# Patient Record
Sex: FEMALE | Race: WHITE | NOT HISPANIC OR LATINO | Employment: OTHER | URBAN - METROPOLITAN AREA
[De-identification: names, ages, dates, MRNs, and addresses within clinical notes are randomized per-mention and may not be internally consistent; named-entity substitution may affect disease eponyms.]

---

## 2020-03-13 ENCOUNTER — HOSPITAL ENCOUNTER (EMERGENCY)
Facility: HOSPITAL | Age: 85
Discharge: HOME | End: 2020-03-13
Attending: EMERGENCY MEDICINE
Payer: MEDICARE

## 2020-03-13 VITALS
BODY MASS INDEX: 29.44 KG/M2 | HEART RATE: 76 BPM | OXYGEN SATURATION: 94 % | DIASTOLIC BLOOD PRESSURE: 72 MMHG | TEMPERATURE: 99 F | HEIGHT: 62 IN | WEIGHT: 160 LBS | SYSTOLIC BLOOD PRESSURE: 167 MMHG | RESPIRATION RATE: 20 BRPM

## 2020-03-13 DIAGNOSIS — R79.89 ELEVATED BRAIN NATRIURETIC PEPTIDE (BNP) LEVEL: Primary | ICD-10-CM

## 2020-03-13 DIAGNOSIS — J84.9 ILD (INTERSTITIAL LUNG DISEASE): ICD-10-CM

## 2020-03-13 DIAGNOSIS — R05.9 COUGH: ICD-10-CM

## 2020-03-13 LAB
ALBUMIN SERPL BCP-MCNC: 3.4 G/DL (ref 3.5–5.2)
ALP SERPL-CCNC: 118 U/L (ref 55–135)
ALT SERPL W/O P-5'-P-CCNC: 28 U/L (ref 10–44)
ANION GAP SERPL CALC-SCNC: 10 MMOL/L (ref 8–16)
AST SERPL-CCNC: 36 U/L (ref 10–40)
BASOPHILS # BLD AUTO: 0.02 K/UL (ref 0–0.2)
BASOPHILS NFR BLD: 0.3 % (ref 0–1.9)
BILIRUB SERPL-MCNC: 0.5 MG/DL (ref 0.1–1)
BNP SERPL-MCNC: 174 PG/ML (ref 0–99)
BUN SERPL-MCNC: 14 MG/DL (ref 8–23)
CALCIUM SERPL-MCNC: 9 MG/DL (ref 8.7–10.5)
CHLORIDE SERPL-SCNC: 101 MMOL/L (ref 95–110)
CO2 SERPL-SCNC: 28 MMOL/L (ref 23–29)
CREAT SERPL-MCNC: 1 MG/DL (ref 0.5–1.4)
CTP QC/QA: YES
DIFFERENTIAL METHOD: ABNORMAL
EOSINOPHIL # BLD AUTO: 0.1 K/UL (ref 0–0.5)
EOSINOPHIL NFR BLD: 0.6 % (ref 0–8)
ERYTHROCYTE [DISTWIDTH] IN BLOOD BY AUTOMATED COUNT: 13.9 % (ref 11.5–14.5)
EST. GFR  (AFRICAN AMERICAN): >60 ML/MIN/1.73 M^2
EST. GFR  (NON AFRICAN AMERICAN): 52.6 ML/MIN/1.73 M^2
GLUCOSE SERPL-MCNC: 126 MG/DL (ref 70–110)
HCT VFR BLD AUTO: 43.5 % (ref 37–48.5)
HGB BLD-MCNC: 13.3 G/DL (ref 12–16)
IMM GRANULOCYTES # BLD AUTO: 0.03 K/UL (ref 0–0.04)
IMM GRANULOCYTES NFR BLD AUTO: 0.4 % (ref 0–0.5)
LYMPHOCYTES # BLD AUTO: 2.7 K/UL (ref 1–4.8)
LYMPHOCYTES NFR BLD: 33.6 % (ref 18–48)
MCH RBC QN AUTO: 27.1 PG (ref 27–31)
MCHC RBC AUTO-ENTMCNC: 30.6 G/DL (ref 32–36)
MCV RBC AUTO: 89 FL (ref 82–98)
MONOCYTES # BLD AUTO: 1 K/UL (ref 0.3–1)
MONOCYTES NFR BLD: 13 % (ref 4–15)
NEUTROPHILS # BLD AUTO: 4.2 K/UL (ref 1.8–7.7)
NEUTROPHILS NFR BLD: 52.1 % (ref 38–73)
NRBC BLD-RTO: 0 /100 WBC
PLATELET # BLD AUTO: 220 K/UL (ref 150–350)
PMV BLD AUTO: 10.2 FL (ref 9.2–12.9)
POC MOLECULAR INFLUENZA A AGN: NEGATIVE
POC MOLECULAR INFLUENZA B AGN: NEGATIVE
POTASSIUM SERPL-SCNC: 3.4 MMOL/L (ref 3.5–5.1)
PROT SERPL-MCNC: 7.3 G/DL (ref 6–8.4)
RBC # BLD AUTO: 4.91 M/UL (ref 4–5.4)
SODIUM SERPL-SCNC: 139 MMOL/L (ref 136–145)
WBC # BLD AUTO: 8 K/UL (ref 3.9–12.7)

## 2020-03-13 PROCEDURE — 85025 COMPLETE CBC W/AUTO DIFF WBC: CPT

## 2020-03-13 PROCEDURE — 83880 ASSAY OF NATRIURETIC PEPTIDE: CPT

## 2020-03-13 PROCEDURE — 99285 PR EMERGENCY DEPT VISIT,LEVEL V: ICD-10-PCS | Mod: ,,, | Performed by: PHYSICIAN ASSISTANT

## 2020-03-13 PROCEDURE — U0002 COVID-19 LAB TEST NON-CDC: HCPCS

## 2020-03-13 PROCEDURE — 87502 INFLUENZA DNA AMP PROBE: CPT

## 2020-03-13 PROCEDURE — 94640 AIRWAY INHALATION TREATMENT: CPT

## 2020-03-13 PROCEDURE — 99285 EMERGENCY DEPT VISIT HI MDM: CPT | Mod: ,,, | Performed by: PHYSICIAN ASSISTANT

## 2020-03-13 PROCEDURE — 25000242 PHARM REV CODE 250 ALT 637 W/ HCPCS: Performed by: PHYSICIAN ASSISTANT

## 2020-03-13 PROCEDURE — 80053 COMPREHEN METABOLIC PANEL: CPT

## 2020-03-13 PROCEDURE — 99284 EMERGENCY DEPT VISIT MOD MDM: CPT | Mod: 25

## 2020-03-13 RX ORDER — POTASSIUM CHLORIDE 750 MG/1
10 TABLET, EXTENDED RELEASE ORAL DAILY
Qty: 5 TABLET | Refills: 0 | Status: SHIPPED | OUTPATIENT
Start: 2020-03-13 | End: 2020-03-18

## 2020-03-13 RX ORDER — FUROSEMIDE 20 MG/1
20 TABLET ORAL 3 TIMES DAILY
Qty: 9 TABLET | Refills: 0 | Status: SHIPPED | OUTPATIENT
Start: 2020-03-13 | End: 2020-03-16

## 2020-03-13 RX ORDER — IPRATROPIUM BROMIDE AND ALBUTEROL SULFATE 2.5; .5 MG/3ML; MG/3ML
3 SOLUTION RESPIRATORY (INHALATION)
Status: COMPLETED | OUTPATIENT
Start: 2020-03-13 | End: 2020-03-13

## 2020-03-13 RX ORDER — FUROSEMIDE 20 MG/1
20 TABLET ORAL 2 TIMES DAILY
COMMUNITY

## 2020-03-13 RX ADMIN — IPRATROPIUM BROMIDE AND ALBUTEROL SULFATE 3 ML: .5; 3 SOLUTION RESPIRATORY (INHALATION) at 06:03

## 2020-03-13 NOTE — ED NOTES
Patient identifiers verified and correct for Giselle Breath  LOC: The patient is awake, alert and aware of environment with an appropriate affect, the patient is oriented x 3 and speaking appropriately.   APPEARANCE: Patient appears comfortable and in no acute distress, patient is clean and well groomed.  SKIN: The skin is warm and dry, color consistent with ethnicity, patient has normal skin turgor and moist mucus membranes, skin intact, no breakdown or bruising noted.   MUSCULOSKELETAL: Patient moving all extremities spontaneously, no swelling noted.  RESPIRATORY: Airway is open and patent, respirations are spontaneous, patient has a normal effort and rate, no accessory muscle use noted, pt placed on pulse ox with O2 sats noted at 95% on room air. Pt has audible wheezes. Pt reports intermittent cough and fever for a week.  CARDIAC: Pt placed on cardiac monitor. Patient has a normal rate and regular rhythm, no edema noted, capillary refill < 3 seconds.   GASTRO: Soft and non tender to palpation, no distention noted, normoactive bowel sounds present in all four quadrants. Pt states bowel movements have been regular.  : Pt denies any pain or frequency with urination.  NEURO: Pt opens eyes spontaneously, behavior appropriate to situation, follows commands, facial expression symmetrical, bilateral hand grasp equal and even, purposeful motor response noted, normal sensation in all extremities when touched with a finger.

## 2020-03-13 NOTE — ED PROVIDER NOTES
"Encounter Date: 3/13/2020       History   No chief complaint on file.    Ms Torres is a 84yoF who presents for worsening cough x 1 week; pertinent PMHx HLD, HTN, history of CVA, pHTN, h/o AFib on anticoagulation, ILD.  Patient reports chronic cough for several years".  Recently saw PCP and head CT chest done 2 weeks ago, states "she told me everything was looking good".  Onset of increase in cough and change in production x1 week.  Cough is worse at night and with laying flat.  Improved with sitting up.  Reports mild improvement with home Ventolin.   had similar symptoms last week and has since been improving.  Decreased appetite.  Not associated with fever/chills, shortness of breath, chest pain, sore throat, nasal congestion, myalgias, headache, weakness, leg swelling.  The patients available PMH, PSH, Social History, medications, allergies, and triage vital signs were reviewed just prior to their medical evaluation.  A ten point review of systems was completed and is negative except as documented above.  Patient denies any other acute medical complaint.    Please be advised this text was dictated with "[employer] software and may contain errors due to translation.           Review of patient's allergies indicates:  No Known Allergies  Past Medical History:   Diagnosis Date    Hyperlipidemia     Hypertension     Stroke      Past Surgical History:   Procedure Laterality Date    CHOLECYSTECTOMY      HYSTERECTOMY      OOPHORECTOMY       Family History   Problem Relation Age of Onset    Pancreatic cancer Father     Colon cancer Mother      Social History     Tobacco Use    Smoking status: Former Smoker   Substance Use Topics    Alcohol use: Yes    Drug use: No     Review of Systems   Constitutional: Positive for appetite change. Negative for chills and fever.   HENT: Negative for congestion, rhinorrhea, sinus pressure, sore throat and trouble swallowing.    Eyes: Negative for visual disturbance. "   Respiratory: Positive for cough and wheezing. Negative for shortness of breath.    Cardiovascular: Negative for chest pain, palpitations and leg swelling.   Gastrointestinal: Negative for abdominal pain, constipation, diarrhea, nausea and vomiting.   Genitourinary: Negative for decreased urine volume.   Musculoskeletal: Negative for myalgias, neck pain and neck stiffness.   Skin: Positive for rash (Right lower leg). Negative for wound.   Neurological: Negative for dizziness, weakness and headaches.   Psychiatric/Behavioral: Negative for confusion.       Physical Exam     Initial Vitals   BP Pulse Resp Temp SpO2   03/13/20 1643 03/13/20 1606 03/13/20 1636 03/13/20 1606 03/13/20 1606   (!) 167/72 66 20 98.9 °F (37.2 °C) (!) 94 %      MAP       --                Physical Exam    Vitals reviewed.  Constitutional: She appears well-developed and well-nourished. She is not diaphoretic. No distress.   HENT:   Head: Normocephalic and atraumatic.   Right Ear: External ear normal.   Left Ear: External ear normal.   Mouth/Throat: Oropharynx is clear and moist. No oropharyngeal exudate.   Mild nasal congestion   Eyes: Conjunctivae and EOM are normal. Pupils are equal, round, and reactive to light. No scleral icterus.   Neck: Normal range of motion. Neck supple.   Cardiovascular: Normal rate, regular rhythm and intact distal pulses.   Pulmonary/Chest: No stridor. No respiratory distress. She has wheezes ( inspiratory and expiratory, most prominent over large airway). She has no rhonchi. She has no rales. She exhibits no tenderness.   Frequent coughing on exam  No dyspnea, tachypnea, increased work of breathing   Abdominal: Soft. There is no tenderness.   Musculoskeletal: Normal range of motion. She exhibits no edema.   Lymphadenopathy:     She has no cervical adenopathy.   Neurological: She is alert and oriented to person, place, and time. She has normal strength. No cranial nerve deficit.   Skin: Skin is warm and dry.  Capillary refill takes less than 2 seconds. Rash ( rate foreleg has flat, discoid, erythematous macules that are mildly itchy and dry.  No pain, induration, scaling) noted. No erythema. No pallor.   Psychiatric: She has a normal mood and affect. Her behavior is normal. Judgment and thought content normal.         ED Course   Procedures  Labs Reviewed   B-TYPE NATRIURETIC PEPTIDE   SARS-COV-2 (COVID-19) QUALITATIVE PCR   CBC W/ AUTO DIFFERENTIAL   COMPREHENSIVE METABOLIC PANEL   POCT INFLUENZA A/B MOLECULAR          Imaging Results          X-Ray Chest AP Portable (Final result)  Result time 03/13/20 17:23:12    Final result by Freeman Velasco MD (03/13/20 17:23:12)                 Impression:      No acute radiographic abnormality.      Electronically signed by: Freeman Velasco  Date:    03/13/2020  Time:    17:23             Narrative:    EXAMINATION:  XR CHEST AP PORTABLE    CLINICAL HISTORY:  Cough    TECHNIQUE:  Single frontal view of the chest was performed.    COMPARISON:  04/24/2011    FINDINGS:  Heart is normal size.  No edema.  No effusion or pneumothorax.  Pacemaker device on the left.    No mass, consolidation or significant infiltrate.  No acute osseous abnormality.    No significant change.                                 Medical Decision Making:   History:   Old Medical Records: I decided to obtain old medical records.  Old Records Summarized: records from clinic visits and records from previous admission(s).  Initial Assessment:   Patient presents with increasing chronic cough, worse with lying flat.  Wheezing in large airways, VSS, afebrile, does not appear hypervolemic  Differential Diagnosis:   DDx viral syndrome, pulmonary edema/CHF. Physical exam and history taking lower clinical suspicion for influenza, COVID 19, respiratory failure.  Independently Interpreted Test(s):   I have ordered and independently interpreted X-rays - see prior notes.  Clinical Tests:   Lab Tests: Ordered and  Reviewed  Radiological Study: Ordered and Reviewed  ED Management:  Flu negative, COVID 19 sent due to age and comorbidities.  Clinically, low suspicion for coronavirus. BNP elevation of 170.    Labs are otherwise largely unremarkable. CXR unremarkable.  CT 2 weeks prior without acute findings.  Patient notes she has not taken her Lasix in several days, usually takes it every other day.  Recommended taking 2 Lasix pill daily over the next 3 days with PCP follow-up.  Given potassium tablets. Patient agreed to plan of care and voiced understanding. Discharged in stable condition with strict ED return precautions.    Jo Hackett PA-C  03/13/2020    I discussed the following case, diagnosis and plan of care with attending physician.                     ED Course as of Mar 13 2102   Fri Mar 13, 2020   1920 BNP(!): 174 [MF]      ED Course User Index  [MF] Jo Hackett PA-C                Clinical Impression:       ICD-10-CM ICD-9-CM   1. Elevated brain natriuretic peptide (BNP) level R79.89 790.99   2. Cough R05 786.2   3. ILD (interstitial lung disease) J84.9 515         Disposition:   Disposition: Discharged  Condition: Stable                        Jo Hackett PA-C  03/13/20 2110

## 2020-03-14 NOTE — DISCHARGE INSTRUCTIONS
Take 3 tablets of Lasix (rather than your normal 2 tablets) for the next 3 days. After those 3 days, start taking two daily as you normally do.  Take potassium tablets.  Follow-up with your provider next week regarding elevated BNP level.     Our goal in the emergency department is to always give you outstanding care and exceptional service. You may receive a survey by mail or e-mail in the next week regarding your experience in our ED. We would greatly appreciate your completing and returning the survey. Your feedback provides us with a way to recognize our staff who give very good care and it helps us learn how to improve when your experience was below our aspiration of excellence.

## 2020-03-18 LAB — SARS-COV-2 RNA RESP QL NAA+PROBE: NORMAL
